# Patient Record
Sex: FEMALE | Race: WHITE | NOT HISPANIC OR LATINO | Employment: UNEMPLOYED | URBAN - METROPOLITAN AREA
[De-identification: names, ages, dates, MRNs, and addresses within clinical notes are randomized per-mention and may not be internally consistent; named-entity substitution may affect disease eponyms.]

---
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
## 2025-06-19 NOTE — PROGRESS NOTES
"Well Woman Visit    CC: Scheduled annual well gyn visit  Chief Complaint   Patient presents with    Annual Exam         Contraception:  Birth control pill    Last Completed Pap Smear    This patient has no relevant Health Maintenance data.        Last Completed Mammogram    This patient has no relevant Health Maintenance data.          HPI:   20 y.o.     History of Present Illness  The patient presents for an annual exam.    She reports experiencing irregular menstrual cycles, with periods lasting up to 10 days, followed by a brief cessation and then resumption of bleeding. This pattern has been consistent over the past year. She has been using birth control pills since the sixth grade, initially for acne management. Approximately three years ago, she was prescribed Tri-Lo-Sprintec, which she continues to use. She is uncertain if the color of her pill pack has changed. She expresses a preference to continue with oral contraceptives and is not interested in implants or IUDs. She reports no vaginal dryness or painful intercourse. She mentions a decreased libido over the past year. She has never undergone a pelvic exam and occasionally performs self-breast exams.    GYNECOLOGICAL HISTORY:  - Duration: Up to 10 days  - Frequency: Irregular    CONTRACEPTION:  - Type: Tri-Lo-Sprintec  - History: On birth control pills since sixth grade, Tri-Lo-Sprintec for approximately three years  - Reproductive plans: Prefers to continue with oral contraceptives, not interested in implants or IUDs    SEXUAL HISTORY:  - Reports decreased libido over the past year  - Currently choosing to abstain from intercourse until marriage       PCP: does manage PMHx and preventative labs  History: PMHx, Meds, Allergies, PSHx, Social Hx, and POBHx all reviewed and updated.      PHYSICAL EXAM:  /69   Pulse 75   Ht 172.7 cm (68\")   Wt 85.7 kg (189 lb)   LMP 2025   BMI 28.74 kg/m²  Not found.  General- NAD, alert and oriented, " appropriate  Psych- Normal mood, good memory  Neck- No masses, no thyroid enlargement  CV- Regular rhythm, no murnurs  Resp- CTA to bases, no wheezes  Abdomen- Soft, non distended, non tender, no masses  Breast left-  Bilaterally symmetrical, no masses, non tender, no nipple discharge  Breast right- Bilaterally symmetrical, no masses, non tender, no nipple discharge  External genitalia- Normal female, no lesions  Urethra/meatus- Normal, no masses, non tender  Bladder- Normal, no masses, non tender  Vagina- Normal, no atrophy, no lesions, no discharge.    Cvx- Normal, no lesions, no discharge, No cervical motion tenderness  Uterus- Normal size, shape & consistency.  Non tender, mobile.  Adnexa- No mass, non tender  Anus/Rectum/Perineum- Not performed  Lymphatic- No palpable neck, axillary, or groin nodes  Ext- No edema, no cyanosis    Skin- No lesions, no rashes, no acanthosis nigricans      ASSESSMENT and PLAN:    Diagnoses and all orders for this visit:    1. Well woman exam with routine gynecological exam (Primary)  -     IgP, Aptima HPV    2. Abnormal uterine bleeding (AUB)  -     Cancel: CBC (No Diff)  -     Cancel: TSH  -     Cancel: T4, Free  -     etonogestrel-ethinyl estradiol (NuvaRing) 0.12-0.015 MG/24HR vaginal ring; Insert 1 each into the vagina Every 28 (Twenty-Eight) Days. Insert vaginally and leave in place for 3 consecutive weeks, then remove for 1 week.  Dispense: 3 each; Refill: 4    3. Screen for STD (sexually transmitted disease)  -     Chlamydia trachomatis, Neisseria gonorrhoeae, PCR - Swab, Cervix    4. Encounter for initial prescription of vaginal ring hormonal contraceptive    5. Decreased libido        Assessment & Plan  1. Annual exam.  - A Pap smear will be conducted today, and screening for sexually transmitted infections will also be performed today. She is advised to have the Pap smear annually until she reaches 29 years of age.    2. Breakthrough bleeding and decreased libido.  -  Persistent breakthrough bleeding on Tri-Lo-Sprintec for the past year. Decreased sexual desire noted in the last year.  - Discussed the potential side effects of long-term use of birth control pills, including decreased sexual desire. The NuvaRing was suggested as an alternative contraceptive method, which could potentially alleviate her breakthrough bleeding and restore her libido. The procedure for its use was explained in detail. She was also informed about the availability of pain relief options with IUDs.  - Her current contraceptive method, Tri-Lo-Sprintec, will be discontinued, and she will start using the NuvaRing.    Follow-up: A follow-up visit is scheduled in 3 months.       Preventative:  BREAST HEALTH- Monthly self breast exam importance and how to reviewed. MMG and/or MRI (prn) reviewed per society guidelines and her individual history. Screen: Not medically needed  CERVICAL CANCER Screening- Reviewed current ASCCP guidelines for screening w and wo cotest HPV, age specific.  Screen: Updated today  SEXUAL HEALTH: STD screening recommended.  Ordered      She understands the importance of having any ordered tests to be performed in a timely fashion.  The risks of not performing them include, but are not limited to, advanced cancer stages, bone loss from osteoporosis and/or subsequent increase in morbidity and/or mortality.  She is encouraged to review her results online and/or contact or office if she has questions.     Follow Up:  Return in about 3 months (around 10/7/2025).            Trudy Garcias MD  07/07/2025    [facility] [address] [place] [address]  Dept: [phone]  Dept Fax: [phone]  Loc: [phone]  Loc Fax: [phone]     Patient or patient representative verbalized consent for the use of Ambient Listening during the visit with  Trudy Garcias MD for chart documentation. 7/7/2025  09:30 EDT

## 2025-07-07 ENCOUNTER — OFFICE VISIT (OUTPATIENT)
Dept: OBSTETRICS AND GYNECOLOGY | Age: 20
End: 2025-07-07
Payer: COMMERCIAL

## 2025-07-07 VITALS
SYSTOLIC BLOOD PRESSURE: 105 MMHG | DIASTOLIC BLOOD PRESSURE: 69 MMHG | HEART RATE: 75 BPM | HEIGHT: 68 IN | BODY MASS INDEX: 28.64 KG/M2 | WEIGHT: 189 LBS

## 2025-07-07 DIAGNOSIS — R68.82 DECREASED LIBIDO: ICD-10-CM

## 2025-07-07 DIAGNOSIS — N93.9 ABNORMAL UTERINE BLEEDING (AUB): ICD-10-CM

## 2025-07-07 DIAGNOSIS — Z11.3 SCREEN FOR STD (SEXUALLY TRANSMITTED DISEASE): ICD-10-CM

## 2025-07-07 DIAGNOSIS — Z01.419 WELL WOMAN EXAM WITH ROUTINE GYNECOLOGICAL EXAM: Primary | ICD-10-CM

## 2025-07-07 DIAGNOSIS — Z30.015 ENCOUNTER FOR INITIAL PRESCRIPTION OF VAGINAL RING HORMONAL CONTRACEPTIVE: ICD-10-CM

## 2025-07-07 LAB
C TRACH DNA SPEC QL NAA+PROBE: NOT DETECTED
N GONORRHOEA RRNA SPEC QL NAA+PROBE: NOT DETECTED

## 2025-07-07 PROCEDURE — 87491 CHLMYD TRACH DNA AMP PROBE: CPT | Performed by: OBSTETRICS & GYNECOLOGY

## 2025-07-07 PROCEDURE — 87624 HPV HI-RISK TYP POOLED RSLT: CPT | Performed by: OBSTETRICS & GYNECOLOGY

## 2025-07-07 PROCEDURE — G0123 SCREEN CERV/VAG THIN LAYER: HCPCS | Performed by: OBSTETRICS & GYNECOLOGY

## 2025-07-07 PROCEDURE — 99213 OFFICE O/P EST LOW 20 MIN: CPT | Performed by: OBSTETRICS & GYNECOLOGY

## 2025-07-07 PROCEDURE — 99385 PREV VISIT NEW AGE 18-39: CPT | Performed by: OBSTETRICS & GYNECOLOGY

## 2025-07-07 PROCEDURE — 87591 N.GONORRHOEAE DNA AMP PROB: CPT | Performed by: OBSTETRICS & GYNECOLOGY

## 2025-07-07 RX ORDER — DESVENLAFAXINE 25 MG/1
1 TABLET, EXTENDED RELEASE ORAL DAILY
COMMUNITY
Start: 2025-04-01

## 2025-07-07 RX ORDER — TIRZEPATIDE 15 MG/.5ML
INJECTION, SOLUTION SUBCUTANEOUS
COMMUNITY
Start: 2025-06-18

## 2025-07-07 RX ORDER — ERGOCALCIFEROL 1.25 MG/1
1 CAPSULE, LIQUID FILLED ORAL WEEKLY
COMMUNITY
Start: 2025-04-18

## 2025-07-07 RX ORDER — ETONOGESTREL AND ETHINYL ESTRADIOL VAGINAL RING .015; .12 MG/D; MG/D
1 RING VAGINAL
Qty: 3 EACH | Refills: 4 | Status: SHIPPED | OUTPATIENT
Start: 2025-07-07 | End: 2026-07-07

## 2025-07-07 RX ORDER — NORGESTIMATE AND ETHINYL ESTRADIOL 7DAYSX3 LO
1 KIT ORAL DAILY
COMMUNITY
Start: 2025-05-27 | End: 2025-07-07

## 2025-07-07 RX ORDER — FERROUS SULFATE 325(65) MG
1 TABLET ORAL EVERY 12 HOURS SCHEDULED
COMMUNITY
Start: 2025-05-27

## 2025-07-07 RX ORDER — FOLIC ACID 1 MG/1
1 TABLET ORAL DAILY
COMMUNITY
Start: 2025-04-18

## 2025-07-07 RX ORDER — BUPROPION HYDROCHLORIDE 150 MG/1
1 TABLET ORAL DAILY
COMMUNITY
Start: 2025-04-18

## 2025-07-09 LAB
CYTOLOGIST CVX/VAG CYTO: NORMAL
CYTOLOGY CVX/VAG DOC CYTO: NORMAL
CYTOLOGY CVX/VAG DOC THIN PREP: NORMAL
DX ICD CODE: NORMAL
HPV I/H RISK 4 DNA CVX QL PROBE+SIG AMP: NEGATIVE
OTHER STN SPEC: NORMAL
SERVICE CMNT-IMP: NORMAL
STAT OF ADQ CVX/VAG CYTO-IMP: NORMAL